# Patient Record
Sex: MALE | Race: WHITE | Employment: FULL TIME | URBAN - METROPOLITAN AREA
[De-identification: names, ages, dates, MRNs, and addresses within clinical notes are randomized per-mention and may not be internally consistent; named-entity substitution may affect disease eponyms.]

---

## 2022-09-10 ENCOUNTER — HOSPITAL ENCOUNTER (EMERGENCY)
Age: 32
Discharge: HOME | End: 2022-09-10
Attending: EMERGENCY MEDICINE
Payer: COMMERCIAL

## 2022-09-10 VITALS
HEART RATE: 88 BPM | SYSTOLIC BLOOD PRESSURE: 140 MMHG | TEMPERATURE: 98.1 F | RESPIRATION RATE: 17 BRPM | OXYGEN SATURATION: 98 % | DIASTOLIC BLOOD PRESSURE: 95 MMHG | WEIGHT: 220 LBS | HEIGHT: 70 IN | BODY MASS INDEX: 31.5 KG/M2

## 2022-09-10 DIAGNOSIS — B02.30 HERPES ZOSTER WITH OPHTHALMIC COMPLICATION, UNSPECIFIED HERPES ZOSTER EYE DISEASE: Primary | ICD-10-CM

## 2022-09-10 PROCEDURE — 99283 EMERGENCY DEPT VISIT LOW MDM: CPT

## 2022-09-10 RX ORDER — ERYTHROMYCIN 5 MG/G
1 OINTMENT OPHTHALMIC EVERY 6 HOURS
Qty: 3.5 G | Refills: 0 | Status: SHIPPED | OUTPATIENT
Start: 2022-09-10 | End: 2022-09-17

## 2022-09-10 RX ORDER — HYDROCODONE BITARTRATE AND ACETAMINOPHEN 5; 325 MG/1; MG/1
1 TABLET ORAL EVERY 6 HOURS PRN
Qty: 15 TABLET | Refills: 0 | Status: SHIPPED | OUTPATIENT
Start: 2022-09-10 | End: 2022-09-15

## 2022-09-10 RX ORDER — MUPIROCIN CALCIUM 20 MG/G
CREAM TOPICAL
Qty: 30 G | Refills: 0 | Status: SHIPPED | OUTPATIENT
Start: 2022-09-10

## 2022-09-10 RX ORDER — VALACYCLOVIR HYDROCHLORIDE 1 G/1
1000 TABLET, FILM COATED ORAL 3 TIMES DAILY
Qty: 21 TABLET | Refills: 0 | Status: SHIPPED | OUTPATIENT
Start: 2022-09-10 | End: 2022-09-17

## 2022-09-10 ASSESSMENT — ENCOUNTER SYMPTOMS
FACIAL SWELLING: 1
SHORTNESS OF BREATH: 0
ABDOMINAL PAIN: 0
TROUBLE SWALLOWING: 0
VOMITING: 0
DIARRHEA: 0
VOICE CHANGE: 0

## 2022-09-10 ASSESSMENT — PAIN DESCRIPTION - DESCRIPTORS: DESCRIPTORS: ACHING

## 2022-09-10 ASSESSMENT — PAIN DESCRIPTION - LOCATION: LOCATION: HEAD

## 2022-09-10 ASSESSMENT — PAIN SCALES - GENERAL: PAINLEVEL_OUTOF10: 6

## 2022-09-10 ASSESSMENT — LIFESTYLE VARIABLES
HOW OFTEN DO YOU HAVE A DRINK CONTAINING ALCOHOL: 2-3 TIMES A WEEK
HOW MANY STANDARD DRINKS CONTAINING ALCOHOL DO YOU HAVE ON A TYPICAL DAY: 1 OR 2

## 2022-09-10 ASSESSMENT — PAIN - FUNCTIONAL ASSESSMENT: PAIN_FUNCTIONAL_ASSESSMENT: 0-10

## 2022-09-10 NOTE — Clinical Note
Myra Stephanie was seen and treated in our emergency department on 9/10/2022. He may return to work on 09/13/2022. If you have any questions or concerns, please don't hesitate to call.       Chevy Ramos MD

## 2022-09-10 NOTE — Clinical Note
Rah Troy was seen and treated in our emergency department on 9/10/2022. He may return to work on 09/13/2022. If you have any questions or concerns, please don't hesitate to call.       Milena Kwok MD

## 2022-09-11 NOTE — ED PROVIDER NOTES
Emergency Department Provider Note                   PCP:                No primary care provider on file. Age: 29 y.o. Sex: male       ICD-10-CM    1. Herpes zoster with ophthalmic complication, unspecified herpes zoster eye disease  B02.30 HYDROcodone-acetaminophen (NORCO) 5-325 MG per tablet          DISPOSITION Decision To Discharge 09/10/2022 08:15:08 PM        MDM  Number of Diagnoses or Management Options  Herpes zoster with ophthalmic complication, unspecified herpes zoster eye disease  Diagnosis management comments: I wore appropriate PPE throughout this patient's ED visit. Win Morris MD, 8:21 PM      Rash has appearance of herpes zoster in a dermatomal pattern with eye involvement. Expressed importance of ophthalmology follow-up. Also given erythromycin ophthalmic ointment and mupirocin for possible bacterial infection. Given Valtrex. Advised to apply ice, no heat. Given return precautions. No orders of the defined types were placed in this encounter. Medications - No data to display    New Prescriptions    ERYTHROMYCIN (ROMYCIN) 5 MG/GM OPHTHALMIC OINTMENT    Apply 1 cm to eye in the morning and 1 cm at noon and 1 cm in the evening and 1 cm before bedtime. Do all this for 7 days. To affected eye. HYDROCODONE-ACETAMINOPHEN (NORCO) 5-325 MG PER TABLET    Take 1 tablet by mouth every 6 hours as needed for Pain for up to 5 days. Intended supply: 5 days. Take lowest dose possible to manage pain    MUPIROCIN (BACTROBAN) 2 % CREAM    Apply topically 3 times daily. VALACYCLOVIR (VALTREX) 1 G TABLET    Take 1 tablet by mouth 3 times daily for 7 days        Catarino Carmona is a 29 y.o. male who presents to the Emergency Department with chief complaint of    Chief Complaint   Patient presents with    Facial Swelling      22-year-old male presents with right-sided painful facial rash for the past 2 days. He woke up with swelling around his right eye today.   He reports pain around his right ear, jaw, and forehead. He denies drainage. No new exposures. No itching or fevers. Does have a history of chickenpox as a child. No dental pain. He does report a headache. Review of Systems   Constitutional:  Negative for fever. HENT:  Positive for facial swelling. Negative for dental problem, drooling, trouble swallowing and voice change. Eyes:  Negative for visual disturbance. Respiratory:  Negative for shortness of breath. Cardiovascular:  Negative for chest pain. Gastrointestinal:  Negative for abdominal pain, diarrhea and vomiting. Musculoskeletal:  Negative for joint swelling. Skin:  Positive for rash. Neurological:  Positive for headaches. Negative for speech difficulty. Psychiatric/Behavioral:  Negative for confusion. All other systems reviewed and are negative. History reviewed. No pertinent past medical history. History reviewed. No pertinent surgical history. History reviewed. No pertinent family history. Social History     Socioeconomic History    Marital status:      Spouse name: None    Number of children: None    Years of education: None    Highest education level: None   Tobacco Use    Smoking status: Never     Passive exposure: Never    Smokeless tobacco: Never   Substance and Sexual Activity    Alcohol use: Yes     Alcohol/week: 8.0 standard drinks     Types: 8 Cans of beer per week    Drug use: Never    Sexual activity: Yes     Partners: Female         Patient has no known allergies. Previous Medications    No medications on file        Vitals signs and nursing note reviewed. Patient Vitals for the past 4 hrs:   Temp Pulse Resp BP SpO2   09/10/22 2001 98.1 °F (36.7 °C) 88 17 (!) 145/100 98 %          Physical Exam  Vitals and nursing note reviewed. Constitutional:       Appearance: Normal appearance. HENT:      Head: Normocephalic and atraumatic. Jaw: There is normal jaw occlusion. Comments: Exquisite tenderness to palpation of right ear and maxillary region. Few scattered erythematous vesicular lesions     Right Ear: Swelling and tenderness present. There is impacted cerumen. Nose: Nose normal.      Mouth/Throat:      Mouth: Mucous membranes are moist.   Eyes:      General:         Right eye: No discharge. Extraocular Movements: Extraocular movements intact. Conjunctiva/sclera:      Right eye: Right conjunctiva is injected. No chemosis. Left eye: Left conjunctiva is not injected. Pupils: Pupils are equal, round, and reactive to light. Slit lamp exam:     Right eye: No hyphema or hypopyon. Left eye: No hyphema or hypopyon. Comments: Right lid swelling, no discharge   Cardiovascular:      Rate and Rhythm: Normal rate and regular rhythm. Pulmonary:      Effort: Pulmonary effort is normal. No respiratory distress. Abdominal:      General: Abdomen is flat. There is no distension. Musculoskeletal:         General: No deformity. Normal range of motion. Cervical back: Normal range of motion and neck supple. Skin:     General: Skin is warm and dry. Neurological:      General: No focal deficit present. Mental Status: He is alert. Mental status is at baseline. Psychiatric:         Mood and Affect: Mood normal.                  Procedures      Results Include:    No results found for this or any previous visit (from the past 24 hour(s)). No orders to display                          Voice dictation software was used during the making of this note. This software is not perfect and grammatical and other typographical errors may be present. This note has not been completely proofread for errors.      Lan Lim MD  09/10/22 2026

## 2022-09-11 NOTE — ED TRIAGE NOTES
Pt woke up Thursday morning and noticed some right sided facial swelling around his ear and jaw. On Friday pt noticed swelling around his eyes and nose with some \"spots\" that showed up. No complaint of itching at this time.

## 2022-09-11 NOTE — ED NOTES
I have reviewed discharge instructions with the patient and spouse. The patient and spouse verbalized understanding. Patient left ED via Discharge Method: ambulatory to Home with wife and daughter . Opportunity for questions and clarification provided. Patient given 4 scripts. Education was given with verbal feedback to nurse. The pt was in no acute distress at [place]. To continue your aftercare when you leave the hospital, you may receive an automated call from our care team to check in on how you are doing. This is a free service and part of our promise to provide the best care and service to meet your aftercare needs.  If you have questions, or wish to unsubscribe from this service please call [phone]. Thank you for Choosing our [facility].

## 2022-09-11 NOTE — DISCHARGE INSTRUCTIONS
Apply cool compresses to face, no heat. Use erythromycin eye ointment. Apply thin layer of mupirocin ointment to lesions. Take all Valtrex. Take Norco only as needed for severe pain. You must follow-up with ophthalmology on Monday for further evaluation due to risk of eye involvement resulting in possible loss of vision. Return for any worsening or concerning symptoms.